# Patient Record
Sex: MALE | Race: BLACK OR AFRICAN AMERICAN | Employment: UNEMPLOYED | URBAN - METROPOLITAN AREA
[De-identification: names, ages, dates, MRNs, and addresses within clinical notes are randomized per-mention and may not be internally consistent; named-entity substitution may affect disease eponyms.]

---

## 2021-07-09 ENCOUNTER — OFFICE VISIT (OUTPATIENT)
Dept: FAMILY MEDICINE CLINIC | Age: 31
End: 2021-07-09
Payer: MEDICAID

## 2021-07-09 VITALS
HEART RATE: 75 BPM | SYSTOLIC BLOOD PRESSURE: 106 MMHG | BODY MASS INDEX: 29.28 KG/M2 | DIASTOLIC BLOOD PRESSURE: 66 MMHG | WEIGHT: 216.2 LBS | TEMPERATURE: 98.1 F | HEIGHT: 72 IN

## 2021-07-09 DIAGNOSIS — B35.1 ONYCHOMYCOSIS: ICD-10-CM

## 2021-07-09 DIAGNOSIS — Z00.00 HEALTHCARE MAINTENANCE: ICD-10-CM

## 2021-07-09 DIAGNOSIS — H60.331 ACUTE SWIMMER'S EAR OF RIGHT SIDE: Primary | ICD-10-CM

## 2021-07-09 PROCEDURE — G8427 DOCREV CUR MEDS BY ELIG CLIN: HCPCS | Performed by: STUDENT IN AN ORGANIZED HEALTH CARE EDUCATION/TRAINING PROGRAM

## 2021-07-09 PROCEDURE — 90715 TDAP VACCINE 7 YRS/> IM: CPT | Performed by: FAMILY MEDICINE

## 2021-07-09 PROCEDURE — 4130F TOPICAL PREP RX AOE: CPT | Performed by: STUDENT IN AN ORGANIZED HEALTH CARE EDUCATION/TRAINING PROGRAM

## 2021-07-09 PROCEDURE — 4004F PT TOBACCO SCREEN RCVD TLK: CPT | Performed by: STUDENT IN AN ORGANIZED HEALTH CARE EDUCATION/TRAINING PROGRAM

## 2021-07-09 PROCEDURE — G8419 CALC BMI OUT NRM PARAM NOF/U: HCPCS | Performed by: STUDENT IN AN ORGANIZED HEALTH CARE EDUCATION/TRAINING PROGRAM

## 2021-07-09 PROCEDURE — 99203 OFFICE O/P NEW LOW 30 MIN: CPT | Performed by: STUDENT IN AN ORGANIZED HEALTH CARE EDUCATION/TRAINING PROGRAM

## 2021-07-09 RX ORDER — HYDROCORTISONE AND ACETIC ACID 20.75; 10.375 MG/ML; MG/ML
3 SOLUTION AURICULAR (OTIC) 3 TIMES DAILY
Qty: 1 BOTTLE | Refills: 0 | Status: SHIPPED | OUTPATIENT
Start: 2021-07-09 | End: 2021-07-19

## 2021-07-09 RX ORDER — TERBINAFINE HYDROCHLORIDE 250 MG/1
250 TABLET ORAL DAILY
Qty: 84 TABLET | Refills: 0 | Status: SHIPPED | OUTPATIENT
Start: 2021-07-09 | End: 2021-10-01

## 2021-07-09 ASSESSMENT — ENCOUNTER SYMPTOMS
BLOOD IN STOOL: 0
NAUSEA: 0
ABDOMINAL PAIN: 0
SORE THROAT: 0
SINUS PRESSURE: 0
SINUS PAIN: 0
DIARRHEA: 0
VOMITING: 0
COUGH: 0
SHORTNESS OF BREATH: 0
CONSTIPATION: 0
RHINORRHEA: 0
COLOR CHANGE: 0
CHEST TIGHTNESS: 0
WHEEZING: 0

## 2021-07-09 ASSESSMENT — PATIENT HEALTH QUESTIONNAIRE - PHQ9
SUM OF ALL RESPONSES TO PHQ QUESTIONS 1-9: 0
1. LITTLE INTEREST OR PLEASURE IN DOING THINGS: 0
SUM OF ALL RESPONSES TO PHQ QUESTIONS 1-9: 0
2. FEELING DOWN, DEPRESSED OR HOPELESS: 0
SUM OF ALL RESPONSES TO PHQ9 QUESTIONS 1 & 2: 0
SUM OF ALL RESPONSES TO PHQ QUESTIONS 1-9: 0

## 2021-07-09 NOTE — PROGRESS NOTES
Subjective:    Jj Early is a 27 y.o. male with  has no past medical history on file. Family History   Problem Relation Age of Onset    High Cholesterol Mother     No Known Problems Father        Presented tot office today for:  Chief Complaint   Patient presents with    New Patient     Est. Care    Otalgia     Right ear pain / feels full    Other     Possible bilateral foot fungus    Lower Back Pain     No injury reported       HPI  Pt is a 54-year-old male presenting to establish care  Patient has a history of onychomycosis of bilateral feet toenails  Has tried topical powders in the past  Has not seen podiatry before    Patient also having right ear irritation and fullness  Recently went swimming at a water park  Denies any fever/chills, discharge, hearing loss    Review of Systems   Constitutional: Negative for appetite change, chills, fatigue and fever. HENT: Positive for ear pain. Negative for drooling, ear discharge, hearing loss, rhinorrhea, sinus pressure, sinus pain and sore throat. Respiratory: Negative for cough, chest tightness, shortness of breath and wheezing. Cardiovascular: Negative for chest pain and palpitations. Gastrointestinal: Negative for abdominal pain, blood in stool, constipation, diarrhea, nausea and vomiting. Genitourinary: Negative for dysuria and flank pain. Musculoskeletal: Negative for joint swelling, neck pain and neck stiffness. Skin: Negative for color change and wound. Neurological: Negative for dizziness, light-headedness, numbness and headaches. Objective:    /66 (Site: Left Upper Arm, Position: Sitting, Cuff Size: Medium Adult) Comment: machine  Pulse 75   Temp 98.1 °F (36.7 °C) (Temporal)   Ht 6' (1.829 m)   Wt 216 lb 3.2 oz (98.1 kg)   BMI 29.32 kg/m²    BP Readings from Last 3 Encounters:   07/09/21 106/66     Physical Exam  Vitals and nursing note reviewed. Constitutional:       Appearance: Normal appearance.  He is well-developed. HENT:      Head: Normocephalic and atraumatic. Left Ear: Tympanic membrane normal.      Ears:      Comments: Mild erythema of the right ear canal and the tympanic membrane. No effusion, bulging. TM intact  Cardiovascular:      Rate and Rhythm: Normal rate and regular rhythm. Heart sounds: Normal heart sounds. Pulmonary:      Effort: Pulmonary effort is normal. No respiratory distress. Breath sounds: Normal breath sounds. No wheezing. Skin:     General: Skin is warm and dry. Comments: Onychomycosis bilateral feet toenails   Neurological:      Mental Status: He is alert. No results found for: WBC, HGB, HCT, PLT, CHOL, TRIG, HDL, LDLDIRECT, ALT, AST, NA, K, CL, CREATININE, BUN, CO2, TSH, PSA, INR, GLUF, LABA1C, LABMICR  No results found for: CALCIUM, PHOS  No results found for: LDLCALC, LDLCHOLESTEROL, LDLDIRECT    Assessment and Plan:    1. Acute swimmer's ear of right side  Instructed patient to follow-up if symptoms do not resolve after 1 week  - acetic acid-hydrocortisone (VOSOL-HC) 1-2 % otic solution; Place 3 drops into the right ear 3 times daily for 10 days  Dispense: 1 Bottle; Refill: 0    2. Onychomycosis  Follow-up in 3 months  - terbinafine (LAMISIL) 250 MG tablet; Take 1 tablet by mouth daily  Dispense: 84 tablet; Refill: 0    3. Healthcare maintenance  - HIV Screen; Future  - Hepatitis C Antibody; Future          Requested Prescriptions     Signed Prescriptions Disp Refills    acetic acid-hydrocortisone (VOSOL-HC) 1-2 % otic solution 1 Bottle 0     Sig: Place 3 drops into the right ear 3 times daily for 10 days    terbinafine (LAMISIL) 250 MG tablet 84 tablet 0     Sig: Take 1 tablet by mouth daily       There are no discontinued medications. Return in about 3 months (around 10/9/2021) for Onychomycosis.

## 2021-07-09 NOTE — PATIENT INSTRUCTIONS
Thank you for letting us take care of you today. We hope all your questions were addressed. If a question was overlooked or something else comes to mind after you return home, please contact a member of your Care Team listed below. Your Care Team at [place] [address] is Team #2  Carin Ball DO (Faculty)  Mark Dubon (Faculty)  Bradley Carpio MD (Resident)  Lemuel Christian MD (Resident)  Bee Conner MD (Resident)  Ubaldo Chan MD (Resident)  Jocelyn Hollis MD (Resident)  Reesa Cogan, LPN Eliberto Bye. ,[facility]  [facility] ([address] [facility])  Win Thomas, [address] (Clinical Practice Manager)  Froilan Dai [facility] (Clinical Pharmacist)     Office phone number: [phone]    If you need to get in right away due to illness, please be advised we have \"Same Day\" appointments available Monday-Friday. Please call us at [phone] option #3 to schedule your \"Same Day\" appointment. Patient Education        Tdap (Tetanus, Diphtheria, Pertussis) Vaccine: What You Need to Know  Why get vaccinated? Tdap vaccine can prevent tetanus, diphtheria, and pertussis. Diphtheria and pertussis spread from person to person. Tetanus enters the body through cuts or wounds. · TETANUS (T) causes painful stiffening of the muscles. Tetanus can lead to serious health problems, including being unable to open the mouth, having trouble swallowing and breathing, or death. · DIPHTHERIA (D) can lead to difficulty breathing, heart failure, paralysis, or death. · PERTUSSIS (aP), also known as \"whooping cough,\" can cause uncontrollable, violent coughing which makes it hard to breathe, eat, or drink. Pertussis can be extremely serious in babies and young children, causing pneumonia, convulsions, brain damage, or death. In teens and adults, it can cause weight loss, loss of bladder control, passing out, and rib fractures from severe coughing.   Tdap vaccine  Tdap is only for children 7 years and older, adolescents, and adults. Adolescents should receive a single dose of Tdap, preferably at age 6 or 15 years. Pregnant women should get a dose of Tdap during every pregnancy, to protect the  from pertussis. Infants are most at risk for severe, life threatening complications from pertussis. Adults who have never received Tdap should get a dose of Tdap. Also, adults should receive a booster dose every 10 years, or earlier in the case of a severe and dirty wound or burn. Booster doses can be either Tdap or Td (a different vaccine that protects against tetanus and diphtheria but not pertussis). Tdap may be given at the same time as other vaccines. Talk with your health care provider  Tell your vaccine provider if the person getting the vaccine:  · Has had an allergic reaction after a previous dose of any vaccine that protects against tetanus, diphtheria, or pertussis, or has any severe, life threatening allergies. · Has had a coma, decreased level of consciousness, or prolonged seizures within 7 days after a previous dose of any pertussis vaccine (DTP, DTaP, or Tdap). · Has seizures or another nervous system problem. · Has ever had Guillain-Barré Syndrome (also called GBS). · Has had severe pain or swelling after a previous dose of any vaccine that protects against tetanus or diphtheria. In some cases, your health care provider may decide to postpone Tdap vaccination to a future visit. People with minor illnesses, such as a cold, may be vaccinated. People who are moderately or severely ill should usually wait until they recover before getting Tdap vaccine. Your health care provider can give you more information. Risks of a vaccine reaction  · Pain, redness, or swelling where the shot was given, mild fever, headache, feeling tired, and nausea, vomiting, diarrhea, or stomachache sometimes happen after Tdap vaccine. People sometimes faint after medical procedures, including vaccination.  Tell your provider if you feel dizzy or have vision changes or ringing in the ears. As with any medicine, there is a very remote chance of a vaccine causing a severe allergic reaction, other serious injury, or death. What if there is a serious problem? An allergic reaction could occur after the vaccinated person leaves the clinic. If you see signs of a severe allergic reaction (hives, swelling of the face and throat, difficulty breathing, a fast heartbeat, dizziness, or weakness), call 9-1-1 and get the person to the nearest hospital.  For other signs that concern you, call your health care provider. Adverse reactions should be reported to the Vaccine Adverse Event Reporting System (VAERS). Your health care provider will usually file this report, or you can do it yourself. Visit the VAERS website at www.vaers. hhs.gov or call 8-849.274.5208. VAERS is only for reporting reactions, and VAERS staff do not give medical advice. The National Vaccine Injury Compensation Program  The National Vaccine Injury Compensation Program (VICP) is a federal program that was created to compensate people who may have been injured by certain vaccines. Visit the VICP website at www.hrsa.gov/vaccinecompensation or call 4-205.402.7246 to learn about the program and about filing a claim. There is a time limit to file a claim for compensation. How can I learn more? · Ask your health care provider. · Call your local or state health department. · Contact the Centers for Disease Control and Prevention (CDC):  ? Call 7-983.562.9385 (1-800-CDC-INFO) or  ? Visit CDC's website at www.cdc.gov/vaccines  Vaccine Information Statement (Interim)  Tdap (Tetanus, Diphtheria, Pertussis) Vaccine  04/01/2020  42 JUAN Malloy [id number]  Department of Health and Human Services  Centers for Disease Control and Prevention  Many Vaccine Information Statements are available in Bulgarian and other languages. See www.immunize.org/vis.   Muchas hojas de información sobre vacunas están disponibles en español y en otros idiomas. Visite www.immunize.org/vis. Care instructions adapted under license by [facility] ([facility]). If you have questions about a medical condition or this instruction, always ask your healthcare professional. [address] any warranty or liability for your use of this information.

## 2021-07-09 NOTE — PROGRESS NOTES
Visit Information    Have you changed or started any medications since your last visit including any over-the-counter medicines, vitamins, or herbal medicines? no   Have you stopped taking any of your medications? Is so, why? -  no  Are you having any side effects from any of your medications? - no    Have you seen any other physician or provider since your last visit?  no   Have you had any other diagnostic tests since your last visit?  no   Have you been seen in the emergency room and/or had an admission in a hospital since we last saw you?  no   Have you had your routine dental cleaning in the past 6 months?  yes - June     Do you have an active [employer] account? If no, what is the barrier?   No: Declined    No care team member to display    Medical History Review  Past Medical, Family, and Social History reviewed and does not contribute to the patient presenting condition    Health Maintenance   Topic Date Due    Hepatitis C screen  Never done    Varicella vaccine (1 of 2 - 2-dose childhood series) Never done    COVID-19 Vaccine (1) Never done    HIV screen  Never done    DTaP/Tdap/Td vaccine (1 - Tdap) Never done    Flu vaccine (1) 09/01/2021    Hepatitis A vaccine  Aged Out    Hepatitis B vaccine  Aged Out    Hib vaccine  Aged Out    Meningococcal (ACWY) vaccine  Aged Out    Pneumococcal 0-64 years Vaccine  Aged Out

## 2021-07-26 ENCOUNTER — TELEPHONE (OUTPATIENT)
Dept: FAMILY MEDICINE CLINIC | Age: 31
End: 2021-07-26

## 2021-07-26 NOTE — TELEPHONE ENCOUNTER
PA request received for Hydrocortisone-Acetic Acid     PA processed and submitted to pt insurance, waiting for response in regards to medication coverage

## 2021-07-27 NOTE — PROGRESS NOTES
Attending Physician Statement    Wt Readings from Last 3 Encounters:   07/09/21 216 lb 3.2 oz (98.1 kg)     Temp Readings from Last 3 Encounters:   07/09/21 98.1 °F (36.7 °C) (Temporal)     BP Readings from Last 3 Encounters:   07/09/21 106/66     Pulse Readings from Last 3 Encounters:   07/09/21 75         I have discussed the care of [employer], including pertinent history and exam findings with the resident. I have reviewed the key elements of all parts of the encounter with the resident. I agree with the assessment, plan and orders as documented by the resident.   (GE Modifier)

## 2022-03-01 ENCOUNTER — HOSPITAL ENCOUNTER (EMERGENCY)
Age: 32
Discharge: HOME | End: 2022-03-01
Attending: EMERGENCY MEDICINE
Payer: MEDICAID

## 2022-03-01 ENCOUNTER — APPOINTMENT (OUTPATIENT)
Dept: GENERAL RADIOLOGY | Age: 32
End: 2022-03-01
Payer: MEDICAID

## 2022-03-01 ENCOUNTER — APPOINTMENT (OUTPATIENT)
Dept: CT IMAGING | Age: 32
End: 2022-03-01
Payer: MEDICAID

## 2022-03-01 VITALS
OXYGEN SATURATION: 95 % | SYSTOLIC BLOOD PRESSURE: 121 MMHG | TEMPERATURE: 98.5 F | DIASTOLIC BLOOD PRESSURE: 79 MMHG | HEART RATE: 96 BPM | RESPIRATION RATE: 18 BRPM

## 2022-03-01 DIAGNOSIS — S00.83XA CONTUSION OF FACE, INITIAL ENCOUNTER: Primary | ICD-10-CM

## 2022-03-01 PROCEDURE — 99282 EMERGENCY DEPT VISIT SF MDM: CPT

## 2022-03-01 PROCEDURE — 6370000000 HC RX 637 (ALT 250 FOR IP): Performed by: STUDENT IN AN ORGANIZED HEALTH CARE EDUCATION/TRAINING PROGRAM

## 2022-03-01 PROCEDURE — 70450 CT HEAD/BRAIN W/O DYE: CPT

## 2022-03-01 PROCEDURE — 72125 CT NECK SPINE W/O DYE: CPT

## 2022-03-01 PROCEDURE — 71045 X-RAY EXAM CHEST 1 VIEW: CPT

## 2022-03-01 PROCEDURE — 73030 X-RAY EXAM OF SHOULDER: CPT

## 2022-03-01 RX ORDER — ONDANSETRON 4 MG/1
4 TABLET, ORALLY DISINTEGRATING ORAL ONCE
Status: COMPLETED | OUTPATIENT
Start: 2022-03-01 | End: 2022-03-01

## 2022-03-01 RX ADMIN — ONDANSETRON 4 MG: 4 TABLET, ORALLY DISINTEGRATING ORAL at 02:57

## 2022-03-01 ASSESSMENT — ENCOUNTER SYMPTOMS
CHEST TIGHTNESS: 0
CONSTIPATION: 0
ABDOMINAL PAIN: 0
VOMITING: 0
NAUSEA: 1
PHOTOPHOBIA: 0
BACK PAIN: 0
COLOR CHANGE: 0
TROUBLE SWALLOWING: 0
SHORTNESS OF BREATH: 0
COUGH: 0
DIARRHEA: 0

## 2022-03-01 ASSESSMENT — PAIN DESCRIPTION - LOCATION: LOCATION: HEAD

## 2022-03-01 ASSESSMENT — PAIN SCALES - GENERAL: PAINLEVEL_OUTOF10: 9

## 2022-03-01 ASSESSMENT — PAIN DESCRIPTION - DESCRIPTORS: DESCRIPTORS: ACHING

## 2022-03-01 ASSESSMENT — PAIN - FUNCTIONAL ASSESSMENT: PAIN_FUNCTIONAL_ASSESSMENT: 0-10

## 2022-03-01 ASSESSMENT — PAIN DESCRIPTION - PAIN TYPE: TYPE: ACUTE PAIN

## 2022-03-01 NOTE — ED PROVIDER NOTES
[address] [facility]     Emergency Department     Faculty Attestation    I performed a history and physical examination of the patient and discussed management with the resident. I have reviewed and agree with the residents findings including all diagnostic interpretations, and treatment plans as written. Any areas of disagreement are noted on the chart. I was personally present for the key portions of any procedures. I have documented in the chart those procedures where I was not present during the key portions. I have reviewed the emergency nurses triage note. I agree with the chief complaint, past medical history, past surgical history, allergies, medications, social and family history as documented unless otherwise noted below. Documentation of the HPI, Physical Exam and Medical Decision Making performed by scriblazara is based on my personal performance of the HPI, PE and MDM. For Physician Assistant/ Nurse Practitioner cases/documentation I have personally evaluated this patient and have completed at least one if not all key elements of the E/M (history, physical exam, and MDM). Additional findings are as noted. 33 yo M c/o head injury, pt c/o nausea, no loc, pt denies chest or abdominal pain,   pe vss gcs 15, even gait, salomón, diffuse tenderness to neck, no crepitus or deformity, chest non tender, abdomen non tender, no distension, no rigidity,   Extremities x 4 nv intact,     Ct head -, ct neck -  cxr -, xr L shoulder -  -no apparent illness, no apparent injury, pt talkative, ambulatory,   Not having any finding of acute injury at this time,     EKG Interpretation    Interpreted by me      CRITICAL CARE: There was a high probability of clinically significant/life threatening deterioration in this patient's condition which required my urgent intervention. Total critical care time was 5 minutes. This excludes any time for separately reportable procedures. [facility], DO  03/01/22 [address], DO  03/01/22 [address] [facility], DO  03/01/22 0910
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
